# Patient Record
Sex: MALE | Race: WHITE | NOT HISPANIC OR LATINO | Employment: UNEMPLOYED | URBAN - METROPOLITAN AREA
[De-identification: names, ages, dates, MRNs, and addresses within clinical notes are randomized per-mention and may not be internally consistent; named-entity substitution may affect disease eponyms.]

---

## 2023-11-13 ENCOUNTER — HOSPITAL ENCOUNTER (OUTPATIENT)
Dept: SLEEP MEDICINE | Facility: HOSPITAL | Age: 48
Discharge: HOME | End: 2023-11-13
Attending: INTERNAL MEDICINE
Payer: MEDICAID

## 2023-11-13 DIAGNOSIS — G47.33 OBSTRUCTIVE SLEEP APNEA (ADULT) (PEDIATRIC): ICD-10-CM

## 2023-11-13 PROCEDURE — 95811 POLYSOM 6/>YRS CPAP 4/> PARM: CPT

## 2023-11-30 DIAGNOSIS — G47.33 OBSTRUCTIVE SLEEP APNEA (ADULT) (PEDIATRIC): Primary | ICD-10-CM

## 2024-10-14 ENCOUNTER — LAB VISIT (OUTPATIENT)
Dept: LAB | Facility: HOSPITAL | Age: 49
End: 2024-10-14
Attending: STUDENT IN AN ORGANIZED HEALTH CARE EDUCATION/TRAINING PROGRAM
Payer: MEDICAID

## 2024-10-14 ENCOUNTER — OFFICE VISIT (OUTPATIENT)
Dept: ENDOCRINOLOGY | Facility: CLINIC | Age: 49
End: 2024-10-14
Payer: MEDICAID

## 2024-10-14 VITALS
WEIGHT: 293.63 LBS | DIASTOLIC BLOOD PRESSURE: 62 MMHG | SYSTOLIC BLOOD PRESSURE: 100 MMHG | OXYGEN SATURATION: 100 % | RESPIRATION RATE: 17 BRPM | HEART RATE: 63 BPM | BODY MASS INDEX: 43.49 KG/M2 | HEIGHT: 69 IN

## 2024-10-14 DIAGNOSIS — R63.5 WEIGHT GAIN: ICD-10-CM

## 2024-10-14 DIAGNOSIS — K91.2 HYPOGLYCEMIA AFTER GI (GASTROINTESTINAL) SURGERY: Primary | ICD-10-CM

## 2024-10-14 DIAGNOSIS — M25.50 ARTHRALGIA, UNSPECIFIED JOINT: ICD-10-CM

## 2024-10-14 DIAGNOSIS — K91.2 HYPOGLYCEMIA AFTER GI (GASTROINTESTINAL) SURGERY: ICD-10-CM

## 2024-10-14 LAB
ALBUMIN SERPL BCP-MCNC: 3.8 G/DL (ref 3.5–5.2)
ALP SERPL-CCNC: 110 U/L (ref 55–135)
ALT SERPL W/O P-5'-P-CCNC: 37 U/L (ref 10–44)
ANION GAP SERPL CALC-SCNC: 9 MMOL/L (ref 8–16)
AST SERPL-CCNC: 28 U/L (ref 10–40)
BILIRUB SERPL-MCNC: 0.5 MG/DL (ref 0.1–1)
BUN SERPL-MCNC: 13 MG/DL (ref 6–20)
C PEPTIDE SERPL-MCNC: 3.39 NG/ML (ref 0.78–5.19)
CALCIUM SERPL-MCNC: 9.1 MG/DL (ref 8.7–10.5)
CHLORIDE SERPL-SCNC: 105 MMOL/L (ref 95–110)
CO2 SERPL-SCNC: 25 MMOL/L (ref 23–29)
CREAT SERPL-MCNC: 1 MG/DL (ref 0.5–1.4)
EST. GFR  (NO RACE VARIABLE): >60 ML/MIN/1.73 M^2
ESTIMATED AVG GLUCOSE: 117 MG/DL (ref 68–131)
FSH SERPL-ACNC: 4.17 MIU/ML (ref 0.95–11.95)
GLUCOSE SERPL-MCNC: 100 MG/DL (ref 70–110)
HBA1C MFR BLD: 5.7 % (ref 4–5.6)
LH SERPL-ACNC: 2.7 MIU/ML (ref 0.6–12.1)
POTASSIUM SERPL-SCNC: 4.4 MMOL/L (ref 3.5–5.1)
PROLACTIN SERPL IA-MCNC: 11.1 NG/ML (ref 3.5–19.4)
PROT SERPL-MCNC: 7 G/DL (ref 6–8.4)
SODIUM SERPL-SCNC: 139 MMOL/L (ref 136–145)
T3 SERPL-MCNC: 117 NG/DL (ref 60–180)
T4 FREE SERPL-MCNC: 1.03 NG/DL (ref 0.71–1.51)
TESTOST SERPL-MCNC: 330 NG/DL (ref 304–1227)
THYROGLOB AB SERPL IA-ACNC: 57.1 IU/ML (ref 0–3.9)
THYROPEROXIDASE IGG SERPL-ACNC: 50.3 IU/ML
TSH SERPL DL<=0.005 MIU/L-ACNC: 2.85 UIU/ML (ref 0.4–4)

## 2024-10-14 PROCEDURE — 84480 ASSAY TRIIODOTHYRONINE (T3): CPT | Performed by: STUDENT IN AN ORGANIZED HEALTH CARE EDUCATION/TRAINING PROGRAM

## 2024-10-14 PROCEDURE — 82024 ASSAY OF ACTH: CPT | Performed by: STUDENT IN AN ORGANIZED HEALTH CARE EDUCATION/TRAINING PROGRAM

## 2024-10-14 PROCEDURE — 86376 MICROSOMAL ANTIBODY EACH: CPT | Performed by: STUDENT IN AN ORGANIZED HEALTH CARE EDUCATION/TRAINING PROGRAM

## 2024-10-14 PROCEDURE — 83001 ASSAY OF GONADOTROPIN (FSH): CPT | Performed by: STUDENT IN AN ORGANIZED HEALTH CARE EDUCATION/TRAINING PROGRAM

## 2024-10-14 PROCEDURE — 86800 THYROGLOBULIN ANTIBODY: CPT | Performed by: STUDENT IN AN ORGANIZED HEALTH CARE EDUCATION/TRAINING PROGRAM

## 2024-10-14 PROCEDURE — 1159F MED LIST DOCD IN RCRD: CPT | Mod: CPTII,,, | Performed by: STUDENT IN AN ORGANIZED HEALTH CARE EDUCATION/TRAINING PROGRAM

## 2024-10-14 PROCEDURE — 3078F DIAST BP <80 MM HG: CPT | Mod: CPTII,,, | Performed by: STUDENT IN AN ORGANIZED HEALTH CARE EDUCATION/TRAINING PROGRAM

## 2024-10-14 PROCEDURE — 84681 ASSAY OF C-PEPTIDE: CPT | Performed by: STUDENT IN AN ORGANIZED HEALTH CARE EDUCATION/TRAINING PROGRAM

## 2024-10-14 PROCEDURE — 84305 ASSAY OF SOMATOMEDIN: CPT | Performed by: STUDENT IN AN ORGANIZED HEALTH CARE EDUCATION/TRAINING PROGRAM

## 2024-10-14 PROCEDURE — 83036 HEMOGLOBIN GLYCOSYLATED A1C: CPT | Performed by: STUDENT IN AN ORGANIZED HEALTH CARE EDUCATION/TRAINING PROGRAM

## 2024-10-14 PROCEDURE — 84403 ASSAY OF TOTAL TESTOSTERONE: CPT | Performed by: STUDENT IN AN ORGANIZED HEALTH CARE EDUCATION/TRAINING PROGRAM

## 2024-10-14 PROCEDURE — 86038 ANTINUCLEAR ANTIBODIES: CPT | Performed by: STUDENT IN AN ORGANIZED HEALTH CARE EDUCATION/TRAINING PROGRAM

## 2024-10-14 PROCEDURE — 99999 PR PBB SHADOW E&M-EST. PATIENT-LVL III: CPT | Mod: PBBFAC,,, | Performed by: STUDENT IN AN ORGANIZED HEALTH CARE EDUCATION/TRAINING PROGRAM

## 2024-10-14 PROCEDURE — 1160F RVW MEDS BY RX/DR IN RCRD: CPT | Mod: CPTII,,, | Performed by: STUDENT IN AN ORGANIZED HEALTH CARE EDUCATION/TRAINING PROGRAM

## 2024-10-14 PROCEDURE — 84439 ASSAY OF FREE THYROXINE: CPT | Performed by: STUDENT IN AN ORGANIZED HEALTH CARE EDUCATION/TRAINING PROGRAM

## 2024-10-14 PROCEDURE — 80053 COMPREHEN METABOLIC PANEL: CPT | Performed by: STUDENT IN AN ORGANIZED HEALTH CARE EDUCATION/TRAINING PROGRAM

## 2024-10-14 PROCEDURE — 3074F SYST BP LT 130 MM HG: CPT | Mod: CPTII,,, | Performed by: STUDENT IN AN ORGANIZED HEALTH CARE EDUCATION/TRAINING PROGRAM

## 2024-10-14 PROCEDURE — 99213 OFFICE O/P EST LOW 20 MIN: CPT | Mod: PBBFAC | Performed by: STUDENT IN AN ORGANIZED HEALTH CARE EDUCATION/TRAINING PROGRAM

## 2024-10-14 PROCEDURE — 99204 OFFICE O/P NEW MOD 45 MIN: CPT | Mod: S$PBB,,, | Performed by: STUDENT IN AN ORGANIZED HEALTH CARE EDUCATION/TRAINING PROGRAM

## 2024-10-14 PROCEDURE — 84146 ASSAY OF PROLACTIN: CPT | Performed by: STUDENT IN AN ORGANIZED HEALTH CARE EDUCATION/TRAINING PROGRAM

## 2024-10-14 PROCEDURE — 84443 ASSAY THYROID STIM HORMONE: CPT | Performed by: STUDENT IN AN ORGANIZED HEALTH CARE EDUCATION/TRAINING PROGRAM

## 2024-10-14 PROCEDURE — G2211 COMPLEX E/M VISIT ADD ON: HCPCS | Mod: S$PBB,,, | Performed by: STUDENT IN AN ORGANIZED HEALTH CARE EDUCATION/TRAINING PROGRAM

## 2024-10-14 PROCEDURE — 36415 COLL VENOUS BLD VENIPUNCTURE: CPT | Performed by: STUDENT IN AN ORGANIZED HEALTH CARE EDUCATION/TRAINING PROGRAM

## 2024-10-14 PROCEDURE — 3008F BODY MASS INDEX DOCD: CPT | Mod: CPTII,,, | Performed by: STUDENT IN AN ORGANIZED HEALTH CARE EDUCATION/TRAINING PROGRAM

## 2024-10-14 PROCEDURE — 83002 ASSAY OF GONADOTROPIN (LH): CPT | Performed by: STUDENT IN AN ORGANIZED HEALTH CARE EDUCATION/TRAINING PROGRAM

## 2024-10-14 RX ORDER — PANTOPRAZOLE SODIUM 40 MG/1
40 TABLET, DELAYED RELEASE ORAL DAILY
COMMUNITY

## 2024-10-14 RX ORDER — ROSUVASTATIN CALCIUM 40 MG/1
40 TABLET, COATED ORAL DAILY
COMMUNITY
Start: 2024-09-23

## 2024-10-14 NOTE — ASSESSMENT & PLAN NOTE
History of gastric sleeve in 2013  Meets [facility]  Discussed possibility of hypoglycemia after bariatric surgery  Will give CGM trial to evaluate his own patterns  Given handout on dietary measures to prevent recurrent episodes  Will only use pharmacological measures depending response to dietary changes

## 2024-10-14 NOTE — PROGRESS NOTES
"Subjective:      Patient ID: Raplh Cisneros is a 49 y.o. male.    Chief Complaint:  Hypglycemia    History of Present Illness  This is a 49 y.o. male. with a past medical history of gastric sleeve, HTN, HLD here for evaluation of hypoglycemia.      Hypoglycemia  History of gastric sleeve in 2013    For past year:  Tremors, weakness, blurred vision, diaphoresis  No syncope but near syncope  Happened as Once every 2 days, once a week  Glucose has been 58-70 during episodes    Improves with:  Glucose tabs  Peanut butter, protein bar    Also reports progressive weight gained this year, fatigue, joint pain, SOB, redness of skin, BLE edema    Outside labs  7/3/24  A1c 5.6%  Cortisol 16  Insulin 18.3  C-peptide 3.09    6/15/24  Glucose 88  Cr 1.0      Wt Readings from Last 10 Encounters:   10/14/24 133.2 kg (293 lb 9.6 oz)         Review of Systems  As above    Social and family history reviewed  Current medications and allergies reviewed    Objective:   /62   Pulse 63   Resp 17   Ht 5' 9" (1.753 m)   Wt 133.2 kg (293 lb 9.6 oz)   SpO2 100%   BMI 43.36 kg/m²   Physical Exam  Alert, oriented  Dorsocervical fat pad+    BP Readings from Last 1 Encounters:   10/14/24 100/62      Wt Readings from Last 1 Encounters:   10/14/24 0830 133.2 kg (293 lb 9.6 oz)     Body mass index is 43.36 kg/m².    Lab Review:   Lab Results   Component Value Date    HGBA1C 5.4 05/20/2023     No results found for: "CHOL", "HDL", "LDLCALC", "TRIG", "CHOLHDL"  Lab Results   Component Value Date     (L) 05/26/2023    K 3.8 05/26/2023    CO2 27 05/26/2023    BUN 7.9 05/26/2023    CREATININE 0.82 05/26/2023    CALCIUM 8.7 05/26/2023    ANIONGAP 6 05/26/2023    ESTGFRAFRICA >105 05/26/2023       All pertinent labs reviewed    Assessment and Plan     Hypoglycemia after GI (gastrointestinal) surgery  History of gastric sleeve in 2013  Meets [facility]  Discussed possibility of hypoglycemia after bariatric surgery  Will give CGM " trial to evaluate his own patterns  Given handout on dietary measures to prevent recurrent episodes  Will only use pharmacological measures depending response to dietary changes    Weight gain  Recurrent hypoglycemia and hyperinsulinism can contribute to weight gain  Will evaluate for other causes including testing hypercortisolism, thyroid dysfunction, hypogonadism  Will check current insulin/glucose levels in fasting state    Arthralgia  Also reports redness of skin which is new  Check DYLAN      Follow-up in 6 months    Con Qiu MD  Endocrinology

## 2024-10-14 NOTE — ASSESSMENT & PLAN NOTE
Recurrent hypoglycemia and hyperinsulinism can contribute to weight gain  Will evaluate for other causes including testing hypercortisolism, thyroid dysfunction, hypogonadism  Will check current insulin/glucose levels in fasting state

## 2024-10-15 ENCOUNTER — PATIENT MESSAGE (OUTPATIENT)
Dept: ENDOCRINOLOGY | Facility: CLINIC | Age: 49
End: 2024-10-15
Payer: MEDICAID

## 2024-10-15 DIAGNOSIS — E06.3 HYPOTHYROIDISM DUE TO HASHIMOTO THYROIDITIS: Primary | ICD-10-CM

## 2024-10-15 LAB
ACTH PLAS-MCNC: 18 PG/ML (ref 0–46)
ANA SER QL IF: NORMAL

## 2024-10-15 RX ORDER — LEVOTHYROXINE SODIUM 50 UG/1
50 TABLET ORAL
Qty: 30 TABLET | Refills: 11 | Status: SHIPPED | OUTPATIENT
Start: 2024-10-15 | End: 2025-10-15

## 2024-10-16 LAB
IGF-I SERPL-MCNC: 126 NG/ML (ref 40–259)
IGF-I Z-SCORE SERPL: 0 SD

## 2024-10-17 ENCOUNTER — LAB VISIT (OUTPATIENT)
Dept: LAB | Facility: HOSPITAL | Age: 49
End: 2024-10-17
Attending: STUDENT IN AN ORGANIZED HEALTH CARE EDUCATION/TRAINING PROGRAM
Payer: MEDICAID

## 2024-10-17 DIAGNOSIS — M25.50 ARTHRALGIA, UNSPECIFIED JOINT: ICD-10-CM

## 2024-10-17 DIAGNOSIS — K91.2 HYPOGLYCEMIA AFTER GI (GASTROINTESTINAL) SURGERY: ICD-10-CM

## 2024-10-17 DIAGNOSIS — R63.5 WEIGHT GAIN: ICD-10-CM

## 2024-10-17 PROCEDURE — 82533 TOTAL CORTISOL: CPT | Performed by: STUDENT IN AN ORGANIZED HEALTH CARE EDUCATION/TRAINING PROGRAM

## 2024-10-23 ENCOUNTER — PATIENT MESSAGE (OUTPATIENT)
Dept: ENDOCRINOLOGY | Facility: CLINIC | Age: 49
End: 2024-10-23
Payer: MEDICAID

## 2024-10-23 ENCOUNTER — LAB VISIT (OUTPATIENT)
Dept: LAB | Facility: HOSPITAL | Age: 49
End: 2024-10-23
Attending: STUDENT IN AN ORGANIZED HEALTH CARE EDUCATION/TRAINING PROGRAM
Payer: MEDICAID

## 2024-10-23 DIAGNOSIS — R63.5 WEIGHT GAIN: ICD-10-CM

## 2024-10-23 DIAGNOSIS — M25.50 ARTHRALGIA, UNSPECIFIED JOINT: ICD-10-CM

## 2024-10-23 DIAGNOSIS — K91.2 HYPOGLYCEMIA AFTER GI (GASTROINTESTINAL) SURGERY: ICD-10-CM

## 2024-10-23 DIAGNOSIS — E11.649 TYPE 2 DIABETES MELLITUS WITH HYPOGLYCEMIA WITHOUT COMA, WITHOUT LONG-TERM CURRENT USE OF INSULIN: Primary | ICD-10-CM

## 2024-10-23 LAB
CORTIS 12 AM SAL-MCNC: <50 NG/DL
CORTIS 12 AM SAL-MCNC: NORMAL NG/ML
CORTIS 8 AM SAL-MCNC: NORMAL NG/ML
CORTIS 8 AM SAL-MCNC: NORMAL NG/ML
CORTIS 8 PM SAL-MCNC: NORMAL NG/ML
CORTIS 8 PM SAL-MCNC: NORMAL NG/ML
CORTIS SAL-MCNC: <50 NG/DL
CORTIS SAL-MCNC: NORMAL UG/DL
CORTIS SERPL-MCNC: 9 UG/DL (ref 4.3–22.4)

## 2024-10-23 PROCEDURE — 82533 TOTAL CORTISOL: CPT | Performed by: STUDENT IN AN ORGANIZED HEALTH CARE EDUCATION/TRAINING PROGRAM

## 2024-10-23 PROCEDURE — 36415 COLL VENOUS BLD VENIPUNCTURE: CPT | Performed by: STUDENT IN AN ORGANIZED HEALTH CARE EDUCATION/TRAINING PROGRAM

## 2024-10-23 RX ORDER — SEMAGLUTIDE 0.68 MG/ML
0.25 INJECTION, SOLUTION SUBCUTANEOUS
Qty: 3 ML | Refills: 0 | Status: SHIPPED | OUTPATIENT
Start: 2024-10-23

## 2024-11-19 DIAGNOSIS — E11.649 TYPE 2 DIABETES MELLITUS WITH HYPOGLYCEMIA WITHOUT COMA, WITHOUT LONG-TERM CURRENT USE OF INSULIN: ICD-10-CM

## 2024-11-19 RX ORDER — SEMAGLUTIDE 0.68 MG/ML
0.5 INJECTION, SOLUTION SUBCUTANEOUS
Qty: 3 ML | Refills: 11 | Status: SHIPPED | OUTPATIENT
Start: 2024-11-19

## 2024-11-19 NOTE — TELEPHONE ENCOUNTER
----- Message from [place] sent at 2024  2:03 PM CST -----  Contact: [facility]  Ralph Cisneros  MRN: [id number]  : 1975  PCP: Fransisca, Primary Doctor  Home Phone      [phone]  Work Phone      Not on file.  Mobile          [phone]      MESSAGE: Patient needs a refill on Ozempic 0.25-0.5 mg, inject 0.25 mg every 7 days sent to [facility].        Phone: [phone]

## 2024-11-19 NOTE — TELEPHONE ENCOUNTER
Ralph desire refill of Ozempic. Last visit 10/14/2024.  Patient Active Problem List   Diagnosis    Hypoglycemia after GI (gastrointestinal) surgery    Weight gain    Arthralgia     Prior to Admission medications    Medication Sig Start Date End Date Taking? Authorizing Provider   levothyroxine (SYNTHROID) 50 MCG tablet Take 1 tablet (50 mcg total) by mouth before breakfast. 10/15/24 10/15/25  Con Qiu MD   OZEMPIC 0.25 mg or 0.5 mg (2 mg/3 mL) pen injector Inject 0.25 mg into the skin every 7 days. 10/23/24   Con Qiu MD   pantoprazole (PROTONIX) 40 MG tablet Take 40 mg by mouth once daily.    Provider, Historical   rosuvastatin (CRESTOR) 40 MG Tab Take 40 mg by mouth once daily. 9/23/24   Provider, Historical

## 2024-12-17 ENCOUNTER — PATIENT MESSAGE (OUTPATIENT)
Dept: ENDOCRINOLOGY | Facility: CLINIC | Age: 49
End: 2024-12-17
Payer: MEDICAID

## 2024-12-18 DIAGNOSIS — E11.649 TYPE 2 DIABETES MELLITUS WITH HYPOGLYCEMIA WITHOUT COMA, WITHOUT LONG-TERM CURRENT USE OF INSULIN: Primary | ICD-10-CM

## 2024-12-18 RX ORDER — SEMAGLUTIDE 1.34 MG/ML
1 INJECTION, SOLUTION SUBCUTANEOUS
Qty: 3 ML | Refills: 11 | Status: SHIPPED | OUTPATIENT
Start: 2024-12-18 | End: 2025-12-18

## 2025-01-06 ENCOUNTER — TELEPHONE (OUTPATIENT)
Dept: UROLOGY | Facility: CLINIC | Age: 50
End: 2025-01-06
Payer: MEDICAID

## 2025-01-06 NOTE — TELEPHONE ENCOUNTER
Received CT Scan report from [facility] in regards to patient, will place on Dr. Soliz's desk for review.

## 2025-01-08 ENCOUNTER — TELEPHONE (OUTPATIENT)
Dept: UROLOGY | Facility: CLINIC | Age: 50
End: 2025-01-08
Payer: MEDICAID

## 2025-01-08 NOTE — TELEPHONE ENCOUNTER
Called patient from referral received from [facility], scheduled 1/14/2025 @ 3pm. Patient v/u.

## 2025-01-14 ENCOUNTER — LAB VISIT (OUTPATIENT)
Dept: LAB | Facility: HOSPITAL | Age: 50
End: 2025-01-14
Attending: SPECIALIST
Payer: MEDICAID

## 2025-01-14 ENCOUNTER — OFFICE VISIT (OUTPATIENT)
Dept: UROLOGY | Facility: CLINIC | Age: 50
End: 2025-01-14
Attending: SPECIALIST
Payer: MEDICAID

## 2025-01-14 VITALS
HEART RATE: 80 BPM | DIASTOLIC BLOOD PRESSURE: 86 MMHG | BODY MASS INDEX: 42.87 KG/M2 | SYSTOLIC BLOOD PRESSURE: 118 MMHG | OXYGEN SATURATION: 98 % | WEIGHT: 289.44 LBS | HEIGHT: 69 IN

## 2025-01-14 DIAGNOSIS — N40.1 BPH ASSOCIATED WITH NOCTURIA: Primary | ICD-10-CM

## 2025-01-14 DIAGNOSIS — R35.1 BPH ASSOCIATED WITH NOCTURIA: ICD-10-CM

## 2025-01-14 DIAGNOSIS — R35.1 BPH ASSOCIATED WITH NOCTURIA: Primary | ICD-10-CM

## 2025-01-14 DIAGNOSIS — N52.9 ERECTILE DYSFUNCTION, UNSPECIFIED ERECTILE DYSFUNCTION TYPE: ICD-10-CM

## 2025-01-14 DIAGNOSIS — N40.1 BPH ASSOCIATED WITH NOCTURIA: ICD-10-CM

## 2025-01-14 DIAGNOSIS — K40.90 NON-RECURRENT INGUINAL HERNIA WITHOUT OBSTRUCTION OR GANGRENE, UNSPECIFIED LATERALITY: ICD-10-CM

## 2025-01-14 LAB
BACTERIA #/AREA URNS HPF: NORMAL /HPF
COMPLEXED PSA SERPL-MCNC: 0.33 NG/ML (ref 0–4)
MICROSCOPIC COMMENT: NORMAL
RBC #/AREA URNS HPF: 1 /HPF (ref 0–4)
WBC #/AREA URNS HPF: 2 /HPF (ref 0–5)

## 2025-01-14 PROCEDURE — 36415 COLL VENOUS BLD VENIPUNCTURE: CPT | Performed by: SPECIALIST

## 2025-01-14 PROCEDURE — 3008F BODY MASS INDEX DOCD: CPT | Mod: CPTII,,, | Performed by: SPECIALIST

## 2025-01-14 PROCEDURE — 3074F SYST BP LT 130 MM HG: CPT | Mod: CPTII,,, | Performed by: SPECIALIST

## 2025-01-14 PROCEDURE — 1159F MED LIST DOCD IN RCRD: CPT | Mod: CPTII,,, | Performed by: SPECIALIST

## 2025-01-14 PROCEDURE — 81000 URINALYSIS NONAUTO W/SCOPE: CPT | Performed by: SPECIALIST

## 2025-01-14 PROCEDURE — 99213 OFFICE O/P EST LOW 20 MIN: CPT | Mod: PBBFAC | Performed by: SPECIALIST

## 2025-01-14 PROCEDURE — 99999 PR PBB SHADOW E&M-EST. PATIENT-LVL III: CPT | Mod: PBBFAC,,, | Performed by: SPECIALIST

## 2025-01-14 PROCEDURE — 84153 ASSAY OF PSA TOTAL: CPT | Performed by: SPECIALIST

## 2025-01-14 PROCEDURE — 3079F DIAST BP 80-89 MM HG: CPT | Mod: CPTII,,, | Performed by: SPECIALIST

## 2025-01-14 PROCEDURE — 1160F RVW MEDS BY RX/DR IN RCRD: CPT | Mod: CPTII,,, | Performed by: SPECIALIST

## 2025-01-14 PROCEDURE — 99205 OFFICE O/P NEW HI 60 MIN: CPT | Mod: S$PBB,,, | Performed by: SPECIALIST

## 2025-01-14 RX ORDER — SILDENAFIL 50 MG/1
50 TABLET, FILM COATED ORAL DAILY PRN
Qty: 12 TABLET | Refills: 11 | Status: SHIPPED | OUTPATIENT
Start: 2025-01-14 | End: 2026-01-14

## 2025-01-17 ENCOUNTER — PATIENT MESSAGE (OUTPATIENT)
Dept: ENDOCRINOLOGY | Facility: CLINIC | Age: 50
End: 2025-01-17
Payer: MEDICAID

## 2025-01-17 NOTE — PROGRESS NOTES
[facility] - Urology   Clinic Note    SUBJECTIVE:     Chief Complaint   Patient presents with    Erectile Dysfunction    Nocturia     States he's having trouble urinating, has been going on for about 6  months. He thinks it may be getting worse.        Referral from: Shantal Shore FNP.    History of Present Illness:  Ralph Cisneros is a 49 y.o. male who presents to clinic for BPH, ED, RI.    Patient is a pleasant 49-year-old gentleman with a chief complaint of a right inguinal hernia, progressive lower urinary tract symptoms, and erectile dysfunction.  Regarding his right inguinal hernia, I have recommended a referral to General surgery.    Patient was started on Flomax 0.4 mg q.h.s..  He denies dizziness or other side effects.  He notices an improvement in his force of stream.  He typically gets up once a night to void.  He denies a history of urinary tract infections, prostatitis, or hematuria.  He denies a family history of prostate cancer.  No recent PSA.      Regarding his ED, he is interested in trying Viagra.  His libido is reportedly within normal limits.  As documented below, his past medical history is significant for coronary artery disease, CVA, DVT and peripheral vascular disease.  He denies taking nitrates.    Patient endorses no additional complaints at this time.    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    Social History     Tobacco Use    Smoking status: Former     Types: Vaping with nicotine    Smokeless tobacco: Never   Substance Use Topics    Alcohol use: Yes    Drug use: Never       Current Outpatient Medications on File Prior  "to Visit   Medication Sig Dispense Refill    levothyroxine (SYNTHROID) 50 MCG tablet Take 1 tablet (50 mcg total) by mouth before breakfast. 30 tablet 11    OZEMPIC 1 mg/dose (4 mg/3 mL) Inject 1 mg into the skin every 7 days. 3 mL 11    rosuvastatin (CRESTOR) 40 MG Tab Take 40 mg by mouth once daily.      OZEMPIC 0.25 mg or 0.5 mg (2 mg/3 mL) pen injector Inject 0.5 mg into the skin every 7 days. (Patient not taking: Reported on 1/14/2025) 3 mL 11    pantoprazole (PROTONIX) 40 MG tablet Take 40 mg by mouth once daily. (Patient not taking: Reported on 1/14/2025)       No current facility-administered medications on file prior to visit.       Review of patient's allergies indicates:   Allergen Reactions    [place] Hives, Itching and Swelling    Peanut Itching       Review of Systems   Constitutional:  Negative for chills and fever.   Genitourinary:  Positive for frequency. Negative for hematuria.        Review of Systems:  A review of 10+ systems was conducted with pertinent positive and negative findings documented in HPI with all other systems reviewed and negative.    OBJECTIVE:     Estimated body mass index is 42.75 kg/m² as calculated from the following:    Height as of this encounter: 5' 9" (1.753 m).    Weight as of this encounter: 131.3 kg (289 lb 7.4 oz).    Vital Signs (Most Recent)  Vitals:    01/14/25 1451   BP: 118/86   Pulse: 80       Physical Exam:    Physical Exam     GENERAL: patient sitting comfortably  HEENT: normocephalic  NECK: supple, no JVD  PULM: normal chest rise, no increased WOB  HEART: non-diaphoretic  ABDO: soft, nondistended, nontender  BACK: no CVA tenderness bilaterally  SKIN: warm, dry, well perfused  EXT: no bruising or edema  NEURO: grossly normal with no focal deficits  PSYCH: appropriate mood and affect    Genitourinary Exam:  deferred      LABS:     Lab Results   Component Value Date    BUN 13 10/14/2024    CREATININE 1.0 10/14/2024    AST 28 10/14/2024    ALT 37 10/14/2024    " "ALKPHOS 110 10/14/2024    ALBUMIN 3.8 10/14/2024    HGBA1C 5.7 (H) 10/14/2024        Urinalysis:   No results found for: "UAREFLEX"     PSA:  Lab Results   Component Value Date    PSADIAG 0.33 01/14/2025       Testosterone:  Lab Results   Component Value Date    TOTALTESTOST 330 10/14/2024        Imaging:  I have personally reviewed all relevant imaging studies.    No results found for this or any previous visit (from the past 2160 hours).  No results found for this or any previous visit (from the past 2160 hours).  No image results found.         ASSESSMENT     1. BPH associated with nocturia    2. Erectile dysfunction, unspecified erectile dysfunction type    3. Non-recurrent inguinal hernia without obstruction or gangrene, unspecified laterality        PLAN:     Continue tamsulosin 0.4 mg q.h.s.  Rx Viagra-do not take simultaneously with tamsulosin  Recommend general surgery consult regarding right inguinal hernia  Check urinalysis  Check PSA  Follow up 1 month    Bryant Soliz MD  Urology  [facility]     Disclaimer: This note has been generated using voice-recognition software. There may be typographical errors that have been missed during proof-reading.     "

## 2025-02-07 ENCOUNTER — PATIENT MESSAGE (OUTPATIENT)
Dept: ENDOCRINOLOGY | Facility: CLINIC | Age: 50
End: 2025-02-07
Payer: MEDICAID

## 2025-02-16 ENCOUNTER — PATIENT MESSAGE (OUTPATIENT)
Dept: ENDOCRINOLOGY | Facility: CLINIC | Age: 50
End: 2025-02-16
Payer: MEDICAID

## 2025-02-17 DIAGNOSIS — E11.649 TYPE 2 DIABETES MELLITUS WITH HYPOGLYCEMIA WITHOUT COMA, WITHOUT LONG-TERM CURRENT USE OF INSULIN: Primary | ICD-10-CM

## 2025-02-17 RX ORDER — TIRZEPATIDE 7.5 MG/.5ML
7.5 INJECTION, SOLUTION SUBCUTANEOUS
Qty: 4 PEN | Refills: 11 | Status: SHIPPED | OUTPATIENT
Start: 2025-02-17

## 2025-02-18 ENCOUNTER — OFFICE VISIT (OUTPATIENT)
Dept: UROLOGY | Facility: CLINIC | Age: 50
End: 2025-02-18
Attending: SPECIALIST
Payer: MEDICAID

## 2025-02-18 VITALS — HEIGHT: 69 IN | BODY MASS INDEX: 43.36 KG/M2 | WEIGHT: 292.75 LBS

## 2025-02-18 DIAGNOSIS — N52.9 ERECTILE DYSFUNCTION, UNSPECIFIED ERECTILE DYSFUNCTION TYPE: ICD-10-CM

## 2025-02-18 DIAGNOSIS — N40.1 BPH ASSOCIATED WITH NOCTURIA: Primary | ICD-10-CM

## 2025-02-18 DIAGNOSIS — K40.90 NON-RECURRENT INGUINAL HERNIA WITHOUT OBSTRUCTION OR GANGRENE, UNSPECIFIED LATERALITY: ICD-10-CM

## 2025-02-18 DIAGNOSIS — R35.1 BPH ASSOCIATED WITH NOCTURIA: Primary | ICD-10-CM

## 2025-02-18 PROCEDURE — 99213 OFFICE O/P EST LOW 20 MIN: CPT | Mod: PBBFAC | Performed by: SPECIALIST

## 2025-02-20 NOTE — PROGRESS NOTES
"[facility] - Urology   Clinic Note    SUBJECTIVE:     Chief Complaint   Patient presents with    Results     1 mo follow up with psa and UA results       Referral from: No ref. provider found.    History of Present Illness:  Ralph Cisneros is a 49 y.o. male who presents to clinic for BPH, ED, inguinal hernia.    Patient returns for follow up.  I am pleased to report that his PSA is within normal limits at 0.33.  He is supposedly compliant with tamsulosin and denies dizziness or other side effects.  He notices an improvement in his force of stream.  He was for warned not to take this simultaneously with Viagra.  Finally, regarding his inguinal hernia, I have encouraged him to seek a consultation with General surgery.    Patient endorses no additional complaints at this time.    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    CORONARY ANGIOPLASTY WITH STENT PLACEMENT      FEMORAL ARTERY STENT      gastric sleeve      LEG SURGERY      blockage in left leg       No family history on file.    Social History[1]    Medications Ordered Prior to Encounter[2]    Review of patient's allergies indicates:   Allergen Reactions    [place] Hives, Itching and Swelling    Peanut Itching       ROS     Review of Systems:  A review of 10+ systems was conducted with pertinent positive and negative findings documented in HPI with all other systems reviewed and negative.    OBJECTIVE:     Estimated body mass index is 43.23 kg/m² as calculated from the following:    Height as of this encounter: 5' 9" (1.753 m).    Weight as of this " "encounter: 132.8 kg (292 lb 12.3 oz).    Vital Signs (Most Recent)  There were no vitals filed for this visit.    Physical Exam:    Physical Exam     GENERAL: patient sitting comfortably  HEENT: normocephalic  NECK: supple, no JVD  PULM: normal chest rise, no increased WOB  HEART: non-diaphoretic  ABDO: soft, nondistended, nontender  BACK: no CVA tenderness bilaterally  SKIN: warm, dry, well perfused  EXT: no bruising or edema  NEURO: grossly normal with no focal deficits  PSYCH: appropriate mood and affect    Genitourinary Exam:  deferred      LABS:     Lab Results   Component Value Date    BUN 13 10/14/2024    CREATININE 1.0 10/14/2024    AST 28 10/14/2024    ALT 37 10/14/2024    ALKPHOS 110 10/14/2024    ALBUMIN 3.8 10/14/2024    HGBA1C 5.7 (H) 10/14/2024        Urinalysis:   No results found for: "UAREFLEX"     PSA:  Lab Results   Component Value Date    PSADIAG 0.33 01/14/2025       Testosterone:  Lab Results   Component Value Date    TOTALTESTOST 330 10/14/2024        Imaging:  I have personally reviewed all relevant imaging studies.    No results found for this or any previous visit (from the past 2160 hours).  No results found for this or any previous visit (from the past 2160 hours).  No image results found.         ASSESSMENT     1. BPH associated with nocturia    2. Erectile dysfunction, unspecified erectile dysfunction type    3. Non-recurrent inguinal hernia without obstruction or gangrene, unspecified laterality        PLAN:     Continue tamsulosin  Viagra p.r.n.  Follow up with General surgery regarding right inguinal hernia  Annual PSA and SIMONA    Bryant Soliz MD  Urology  [facility]     Disclaimer: This note has been generated using voice-recognition software. There may be typographical errors that have been missed during proof-reading.          [1]   Social History  Tobacco Use    Smoking status: Former     Types: Vaping with nicotine    Smokeless tobacco: Never   Substance Use " Topics    Alcohol use: Yes     Comment: vodka 5 times a week    Drug use: Never   [2]   Current Outpatient Medications on File Prior to Visit   Medication Sig Dispense Refill    buPROPion (WELLBUTRIN SR) 150 MG TBSR 12 hr tablet 1 tablet in the morning Orally Once a day for 90 days      busPIRone (BUSPAR) 7.5 MG tablet 1 tablet Orally at bedtime for 30 days  for anxiety      clopidogreL (PLAVIX) 75 mg tablet Take 75 mg by mouth once daily.      cyanocobalamin-cobamamide (B12) 5,000-100 mcg Lozg as directed Sublingual once daily      furosemide (LASIX) 20 MG tablet Take 20 mg by mouth once daily.      levothyroxine (SYNTHROID) 50 MCG tablet Take 1 tablet (50 mcg total) by mouth before breakfast. 30 tablet 11    metoprolol succinate (TOPROL-XL) 50 MG 24 hr tablet 50 mg 2 (two) times daily.      MOUNJARO 7.5 mg/0.5 mL PnIj Inject 7.5 mg into the skin every 7 days. 4 Pen 11    omeprazole (PRILOSEC) 20 MG capsule 1 capsule.      rivaroxaban (XARELTO) 20 mg Tab Take 20 mg by mouth once daily.      rosuvastatin (CRESTOR) 40 MG Tab Take 40 mg by mouth once daily.      sildenafiL (VIAGRA) 50 MG tablet Take 1 tablet (50 mg total) by mouth daily as needed for Erectile Dysfunction. 12 tablet 11    tamsulosin (FLOMAX) 0.4 mg Cap Take 1 capsule by mouth every evening.      XIIDRA 5 % Dpet Place 1 drop into both eyes 2 (two) times daily.       No current facility-administered medications on file prior to visit.

## 2025-05-14 ENCOUNTER — OFFICE VISIT (OUTPATIENT)
Dept: ENDOCRINOLOGY | Facility: CLINIC | Age: 50
End: 2025-05-14
Payer: MEDICAID

## 2025-05-14 ENCOUNTER — LAB VISIT (OUTPATIENT)
Dept: LAB | Facility: HOSPITAL | Age: 50
End: 2025-05-14
Attending: STUDENT IN AN ORGANIZED HEALTH CARE EDUCATION/TRAINING PROGRAM
Payer: MEDICAID

## 2025-05-14 VITALS
BODY MASS INDEX: 43.05 KG/M2 | DIASTOLIC BLOOD PRESSURE: 82 MMHG | HEIGHT: 69 IN | SYSTOLIC BLOOD PRESSURE: 119 MMHG | WEIGHT: 290.63 LBS

## 2025-05-14 DIAGNOSIS — E11.649 TYPE 2 DIABETES MELLITUS WITH HYPOGLYCEMIA WITHOUT COMA, WITHOUT LONG-TERM CURRENT USE OF INSULIN: ICD-10-CM

## 2025-05-14 DIAGNOSIS — R73.03 PREDIABETES: ICD-10-CM

## 2025-05-14 DIAGNOSIS — R73.03 PREDIABETES: Primary | ICD-10-CM

## 2025-05-14 DIAGNOSIS — E06.3 HYPOTHYROIDISM DUE TO HASHIMOTO THYROIDITIS: ICD-10-CM

## 2025-05-14 DIAGNOSIS — M25.50 ARTHRALGIA, UNSPECIFIED JOINT: ICD-10-CM

## 2025-05-14 DIAGNOSIS — K91.2 HYPOGLYCEMIA AFTER GI (GASTROINTESTINAL) SURGERY: ICD-10-CM

## 2025-05-14 LAB
ALBUMIN SERPL BCP-MCNC: 4.1 G/DL (ref 3.5–5.2)
ALP SERPL-CCNC: 100 UNIT/L (ref 40–150)
ALT SERPL W/O P-5'-P-CCNC: 31 UNIT/L (ref 10–44)
ANION GAP (OHS): 7 MMOL/L (ref 8–16)
AST SERPL-CCNC: 28 UNIT/L (ref 11–45)
BILIRUB SERPL-MCNC: 0.5 MG/DL (ref 0.1–1)
BUN SERPL-MCNC: 12 MG/DL (ref 6–20)
CALCIUM SERPL-MCNC: 9.2 MG/DL (ref 8.7–10.5)
CHLORIDE SERPL-SCNC: 105 MMOL/L (ref 95–110)
CO2 SERPL-SCNC: 26 MMOL/L (ref 23–29)
CREAT SERPL-MCNC: 1 MG/DL (ref 0.5–1.4)
EAG (OHS): 105 MG/DL (ref 68–131)
GFR SERPLBLD CREATININE-BSD FMLA CKD-EPI: >60 ML/MIN/1.73/M2
GLUCOSE SERPL-MCNC: 86 MG/DL (ref 70–110)
HBA1C MFR BLD: 5.3 % (ref 4–5.6)
POTASSIUM SERPL-SCNC: 4.1 MMOL/L (ref 3.5–5.1)
PROT SERPL-MCNC: 7.7 GM/DL (ref 6–8.4)
SODIUM SERPL-SCNC: 138 MMOL/L (ref 136–145)
T4 FREE SERPL-MCNC: 1.07 NG/DL (ref 0.71–1.51)
T4 FREE SERPL-MCNC: 1.07 NG/DL (ref 0.71–1.51)
TSH SERPL-ACNC: 1.43 UIU/ML (ref 0.4–4)

## 2025-05-14 PROCEDURE — 99213 OFFICE O/P EST LOW 20 MIN: CPT | Mod: PBBFAC | Performed by: STUDENT IN AN ORGANIZED HEALTH CARE EDUCATION/TRAINING PROGRAM

## 2025-05-14 PROCEDURE — 84480 ASSAY TRIIODOTHYRONINE (T3): CPT

## 2025-05-14 PROCEDURE — 84439 ASSAY OF FREE THYROXINE: CPT

## 2025-05-14 PROCEDURE — 36415 COLL VENOUS BLD VENIPUNCTURE: CPT

## 2025-05-14 PROCEDURE — 83036 HEMOGLOBIN GLYCOSYLATED A1C: CPT

## 2025-05-14 PROCEDURE — 99999 PR PBB SHADOW E&M-EST. PATIENT-LVL III: CPT | Mod: PBBFAC,,, | Performed by: STUDENT IN AN ORGANIZED HEALTH CARE EDUCATION/TRAINING PROGRAM

## 2025-05-14 PROCEDURE — 80053 COMPREHEN METABOLIC PANEL: CPT

## 2025-05-14 NOTE — ASSESSMENT & PLAN NOTE
Continue GLP-1/GIP RA given weight loss benefit  Pending results, increase dose for added weight/glucose benefit.

## 2025-05-14 NOTE — ASSESSMENT & PLAN NOTE
Positive TPO, Tg with TSH 2.8   We started levothyroxine given he was quite symptomatic but I do not think it has made too much of a difference  Will recheck levels and adjust/stop as indicated

## 2025-05-14 NOTE — ASSESSMENT & PLAN NOTE
A1c is prediabetes range but with significant glucose excursions which then lead to hypoglycemia given history of bariatric surgery  Levels have stabilized with use of GLP-1/GIP RA so, will continue  Will recheck A1c, CMP - pending results will adjust therapy    Plan  - Continue Mounjaro 7.5 mg weekly, increase to 10 mg pending results  - Check A1c, CMP

## 2025-05-14 NOTE — ASSESSMENT & PLAN NOTE
CGM pattern confirmed postprandial pattern suggestive of hypoglycemia related to GI surgery  Failure to only dietary measures so we started pharmacological therapy with GLP-1/GIP RA  Responding well with infrequent hypoglycemia episodes  Continue GLP-1/GIP RA

## 2025-05-14 NOTE — PROGRESS NOTES
"Subjective:      Patient ID: Ralph Csineros is a 50 y.o. male.    Chief Complaint:  Hypglycemia    History of Present Illness  This is a 50 y.o. male. with a past medical history of gastric sleeve, prediabetes/DM2, hypothyroidism, HTN, HLD here for follow up      Prediabetes/Type 2 diabetes mellitus    History of gastric sleeve in 2013  Episodes of hypoglycemia are better      Outside labs  7/3/24  A1c 5.6%  Cortisol 16  Insulin 18.3  C-peptide 3.09    6/15/24  Glucose 88  Cr 1.0      Wt Readings from Last 10 Encounters:   05/14/25 131.8 kg (290 lb 9.6 oz)   04/08/25 131.1 kg (289 lb)   02/18/25 132.8 kg (292 lb 12.3 oz)   02/13/25 132.6 kg (292 lb 5.3 oz)   01/14/25 131.3 kg (289 lb 7.4 oz)   10/14/24 133.2 kg (293 lb 9.6 oz)       Component      Latest Ref 10/15/2024 10/16/2024   Midnight Cortisol      <100 ng/dL <50   <50        Component      Latest Ref Rng 10/14/2024   Somatomedin (IGF-I)      40 - 259 ng/mL 126    Z Score      -2.0 - 2.0 SD 0.00    ACTH      0 - 46 pg/mL 18    TSH      0.400 - 4.000 uIU/mL 2.854    FSH      0.95 - 11.95 mIU/mL 4.17    Luteinizing Hormone      0.6 - 12.1 mIU/mL 2.7    Testosterone, Total      304 - 1227 ng/dL 330    Prolactin      3.5 - 19.4 ng/mL 11.1    Free T4      0.71 - 1.51 ng/dL 1.03            Hypothyroidism     10/14/24 09:46   Thyroglobulin Ab Screen 57.1 (H)   Thyroperoxidase Antibodies 50.3 (H)     Currently on levothyroxine 50 mcg daily  Reports takes 30 min before eating or drinking anything other than water.   Reports taking separate from multivitamins by at least 4 hours    Lab Results   Component Value Date    TSH 2.854 10/14/2024           Review of Systems  As above    Social and family history reviewed  Current medications and allergies reviewed    Objective:   /82 (BP Location: Right arm, Patient Position: Sitting)   Ht 5' 9" (1.753 m)   Wt 131.8 kg (290 lb 9.6 oz)   BMI 42.91 kg/m²   Physical Exam  Alert, oriented  Dorsocervical fat " "pad+    BP Readings from Last 1 Encounters:   05/14/25 119/82      Wt Readings from Last 1 Encounters:   05/14/25 1331 131.8 kg (290 lb 9.6 oz)     Body mass index is 42.91 kg/m².    Lab Review:   Lab Results   Component Value Date    HGBA1C 5.7 (H) 10/14/2024     No results found for: "CHOL", "HDL", "LDLCALC", "TRIG", "CHOLHDL"  Lab Results   Component Value Date     10/14/2024    K 4.4 10/14/2024     10/14/2024    CO2 25 10/14/2024     10/14/2024    BUN 13 10/14/2024    CREATININE 1.0 10/14/2024    CALCIUM 9.1 10/14/2024    PROT 7.0 10/14/2024    ALBUMIN 3.8 10/14/2024    BILITOT 0.5 10/14/2024    ALKPHOS 110 10/14/2024    AST 28 10/14/2024    ALT 37 10/14/2024    ANIONGAP 9 10/14/2024    TSH 2.854 10/14/2024       All pertinent labs reviewed    Assessment and Plan     Prediabetes  A1c is prediabetes range but with significant glucose excursions which then lead to hypoglycemia given history of bariatric surgery  Levels have stabilized with use of GLP-1/GIP RA so, will continue  Will recheck A1c, CMP - pending results will adjust therapy    Plan  - Continue Mounjaro 7.5 mg weekly, increase to 10 mg pending results  - Check A1c, CMP    Hypoglycemia after GI (gastrointestinal) surgery  CGM pattern confirmed postprandial pattern suggestive of hypoglycemia related to GI surgery  Failure to only dietary measures so we started pharmacological therapy with GLP-1/GIP RA  Responding well with infrequent hypoglycemia episodes  Continue GLP-1/GIP RA    BMI 40.0-44.9, adult  Continue GLP-1/GIP RA given weight loss benefit  Pending results, increase dose for added weight/glucose benefit.      Hypothyroidism due to Hashimoto thyroiditis  Positive TPO, Tg with TSH 2.8   We started levothyroxine given he was quite symptomatic but I do not think it has made too much of a difference  Will recheck levels and adjust/stop as indicated    Arthralgia  He will see Rheumatology later this month        Follow-up in 6 " months    Con Qiu MD  Endocrinology

## 2025-05-15 ENCOUNTER — PATIENT MESSAGE (OUTPATIENT)
Dept: ENDOCRINOLOGY | Facility: CLINIC | Age: 50
End: 2025-05-15
Payer: MEDICAID

## 2025-05-15 DIAGNOSIS — E11.649 TYPE 2 DIABETES MELLITUS WITH HYPOGLYCEMIA WITHOUT COMA, WITHOUT LONG-TERM CURRENT USE OF INSULIN: Primary | ICD-10-CM

## 2025-05-15 LAB — T3FREE SERPL-MCNC: 97 NG/DL (ref 60–180)

## 2025-05-15 RX ORDER — TIRZEPATIDE 10 MG/.5ML
10 INJECTION, SOLUTION SUBCUTANEOUS
Qty: 4 PEN | Refills: 11 | Status: SHIPPED | OUTPATIENT
Start: 2025-05-15

## 2025-05-23 ENCOUNTER — TELEPHONE (OUTPATIENT)
Dept: ENDOCRINOLOGY | Facility: CLINIC | Age: 50
End: 2025-05-23
Payer: MEDICAID

## 2025-05-23 NOTE — TELEPHONE ENCOUNTER
Spoke with patient in regards of SNAP disability paperwork has been signed and ready to be picked up. Advised paper is going to be located in the front for him to .     Patient verbalized understanding.

## 2025-06-04 ENCOUNTER — OFFICE VISIT (OUTPATIENT)
Dept: CARDIOLOGY | Facility: CLINIC | Age: 50
End: 2025-06-04
Payer: MEDICAID

## 2025-06-04 ENCOUNTER — HOSPITAL ENCOUNTER (OUTPATIENT)
Dept: PULMONOLOGY | Facility: HOSPITAL | Age: 50
Discharge: HOME | End: 2025-06-04
Attending: INTERNAL MEDICINE
Payer: MEDICAID

## 2025-06-04 VITALS
WEIGHT: 288.13 LBS | BODY MASS INDEX: 42.67 KG/M2 | RESPIRATION RATE: 18 BRPM | HEIGHT: 69 IN | OXYGEN SATURATION: 95 % | HEART RATE: 84 BPM | DIASTOLIC BLOOD PRESSURE: 83 MMHG | SYSTOLIC BLOOD PRESSURE: 117 MMHG

## 2025-06-04 DIAGNOSIS — I25.10 CORONARY ARTERY DISEASE INVOLVING NATIVE CORONARY ARTERY OF NATIVE HEART WITHOUT ANGINA PECTORIS: ICD-10-CM

## 2025-06-04 DIAGNOSIS — I10 PRIMARY HYPERTENSION: ICD-10-CM

## 2025-06-04 DIAGNOSIS — E66.01 CLASS 3 SEVERE OBESITY DUE TO EXCESS CALORIES WITH SERIOUS COMORBIDITY AND BODY MASS INDEX (BMI) OF 40.0 TO 44.9 IN ADULT: ICD-10-CM

## 2025-06-04 DIAGNOSIS — I73.9 PERIPHERAL ARTERIAL DISEASE: ICD-10-CM

## 2025-06-04 DIAGNOSIS — E78.5 HYPERLIPIDEMIA, UNSPECIFIED HYPERLIPIDEMIA TYPE: ICD-10-CM

## 2025-06-04 DIAGNOSIS — Z86.73 HISTORY OF CVA (CEREBROVASCULAR ACCIDENT) WITHOUT RESIDUAL DEFICITS: ICD-10-CM

## 2025-06-04 DIAGNOSIS — I25.118 CORONARY ARTERY DISEASE OF NATIVE ARTERY OF NATIVE HEART WITH STABLE ANGINA PECTORIS: Primary | ICD-10-CM

## 2025-06-04 DIAGNOSIS — I70.0 AORTIC ATHEROSCLEROSIS: ICD-10-CM

## 2025-06-04 DIAGNOSIS — I77.9 AORTIC DISEASE: ICD-10-CM

## 2025-06-04 DIAGNOSIS — E66.813 CLASS 3 SEVERE OBESITY DUE TO EXCESS CALORIES WITH SERIOUS COMORBIDITY AND BODY MASS INDEX (BMI) OF 40.0 TO 44.9 IN ADULT: ICD-10-CM

## 2025-06-04 PROCEDURE — 93005 ELECTROCARDIOGRAM TRACING: CPT

## 2025-06-04 PROCEDURE — 99215 OFFICE O/P EST HI 40 MIN: CPT | Mod: PBBFAC | Performed by: INTERNAL MEDICINE

## 2025-06-04 PROCEDURE — 99999 PR PBB SHADOW E&M-EST. PATIENT-LVL V: CPT | Mod: PBBFAC,,, | Performed by: INTERNAL MEDICINE

## 2025-06-04 RX ORDER — RANOLAZINE 500 MG/1
500 TABLET, EXTENDED RELEASE ORAL 2 TIMES DAILY
Qty: 180 TABLET | Refills: 3 | Status: ACTIVE | OUTPATIENT
Start: 2025-06-04 | End: 2026-06-04

## 2025-06-04 RX ORDER — NITROGLYCERIN 0.4 MG/1
0.4 TABLET SUBLINGUAL EVERY 5 MIN PRN
Qty: 50 TABLET | Refills: 3 | Status: ACTIVE | OUTPATIENT
Start: 2025-06-04 | End: 2026-06-04

## 2025-06-05 LAB
OHS QRS DURATION: 102 MS
OHS QTC CALCULATION: 419 MS

## 2025-06-06 ENCOUNTER — PATIENT MESSAGE (OUTPATIENT)
Dept: CARDIOLOGY | Facility: CLINIC | Age: 50
End: 2025-06-06
Payer: MEDICAID

## 2025-06-06 DIAGNOSIS — I25.10 CORONARY ARTERY DISEASE INVOLVING NATIVE CORONARY ARTERY OF NATIVE HEART WITHOUT ANGINA PECTORIS: ICD-10-CM

## 2025-06-11 ENCOUNTER — PATIENT MESSAGE (OUTPATIENT)
Dept: CARDIOLOGY | Facility: CLINIC | Age: 50
End: 2025-06-11
Payer: MEDICAID

## 2025-06-11 ENCOUNTER — HOSPITAL ENCOUNTER (OUTPATIENT)
Dept: RADIOLOGY | Facility: HOSPITAL | Age: 50
Discharge: HOME | End: 2025-06-11
Attending: INTERNAL MEDICINE
Payer: MEDICAID

## 2025-06-11 DIAGNOSIS — I77.9 AORTIC DISEASE: ICD-10-CM

## 2025-06-11 PROCEDURE — 76775 US EXAM ABDO BACK WALL LIM: CPT | Mod: TC

## 2025-06-11 PROCEDURE — 76775 US EXAM ABDO BACK WALL LIM: CPT | Mod: 26,,, | Performed by: RADIOLOGY

## 2025-07-03 DIAGNOSIS — E78.5 HYPERLIPIDEMIA, UNSPECIFIED HYPERLIPIDEMIA TYPE: ICD-10-CM

## 2025-07-07 RX ORDER — EVOLOCUMAB 140 MG/ML
140 INJECTION, SOLUTION SUBCUTANEOUS
Qty: 2 ML | Refills: 0 | Status: ACTIVE | OUTPATIENT
Start: 2025-07-07 | End: 2025-08-06

## 2025-07-08 ENCOUNTER — PATIENT MESSAGE (OUTPATIENT)
Dept: ENDOCRINOLOGY | Facility: CLINIC | Age: 50
End: 2025-07-08
Payer: MEDICAID

## 2025-07-10 DIAGNOSIS — E11.649 TYPE 2 DIABETES MELLITUS WITH HYPOGLYCEMIA WITHOUT COMA, WITHOUT LONG-TERM CURRENT USE OF INSULIN: Primary | ICD-10-CM

## 2025-07-10 RX ORDER — TIRZEPATIDE 12.5 MG/.5ML
12.5 INJECTION, SOLUTION SUBCUTANEOUS
Qty: 2 ML | Refills: 11 | Status: SHIPPED | OUTPATIENT
Start: 2025-07-10

## 2025-07-21 ENCOUNTER — TELEPHONE (OUTPATIENT)
Dept: ENDOCRINOLOGY | Facility: CLINIC | Age: 50
End: 2025-07-21
Payer: MEDICAID

## 2025-07-23 ENCOUNTER — PATIENT MESSAGE (OUTPATIENT)
Dept: CARDIOLOGY | Facility: CLINIC | Age: 50
End: 2025-07-23
Payer: MEDICAID

## 2025-07-23 ENCOUNTER — PATIENT MESSAGE (OUTPATIENT)
Dept: ENDOCRINOLOGY | Facility: CLINIC | Age: 50
End: 2025-07-23
Payer: MEDICAID

## 2025-07-30 DIAGNOSIS — E78.5 HYPERLIPIDEMIA, UNSPECIFIED HYPERLIPIDEMIA TYPE: ICD-10-CM

## 2025-07-30 RX ORDER — EVOLOCUMAB 140 MG/ML
140 INJECTION, SOLUTION SUBCUTANEOUS
Qty: 2 ML | Refills: 5 | Status: ACTIVE | OUTPATIENT
Start: 2025-07-30 | End: 2025-08-28